# Patient Record
Sex: MALE | Race: BLACK OR AFRICAN AMERICAN | Employment: UNEMPLOYED | URBAN - NONMETROPOLITAN AREA
[De-identification: names, ages, dates, MRNs, and addresses within clinical notes are randomized per-mention and may not be internally consistent; named-entity substitution may affect disease eponyms.]

---

## 2017-06-10 ENCOUNTER — HOSPITAL ENCOUNTER (OUTPATIENT)
Facility: MEDICAL CENTER | Age: 32
End: 2017-06-10
Attending: PHYSICIAN ASSISTANT
Payer: MEDICAID

## 2017-06-10 ENCOUNTER — OFFICE VISIT (OUTPATIENT)
Dept: URGENT CARE | Facility: PHYSICIAN GROUP | Age: 32
End: 2017-06-10
Payer: MEDICAID

## 2017-06-10 VITALS
HEART RATE: 110 BPM | SYSTOLIC BLOOD PRESSURE: 124 MMHG | DIASTOLIC BLOOD PRESSURE: 84 MMHG | OXYGEN SATURATION: 96 % | TEMPERATURE: 98.6 F | RESPIRATION RATE: 16 BRPM

## 2017-06-10 DIAGNOSIS — M94.0 COSTOCHONDRITIS: ICD-10-CM

## 2017-06-10 DIAGNOSIS — R31.9 HEMATURIA: ICD-10-CM

## 2017-06-10 DIAGNOSIS — R39.198 DIFFICULTY URINATING: ICD-10-CM

## 2017-06-10 LAB
APPEARANCE UR: NORMAL
BILIRUB UR STRIP-MCNC: NORMAL MG/DL
COLOR UR AUTO: YELLOW
GLUCOSE UR STRIP.AUTO-MCNC: NORMAL MG/DL
KETONES UR STRIP.AUTO-MCNC: NORMAL MG/DL
LEUKOCYTE ESTERASE UR QL STRIP.AUTO: NORMAL
NITRITE UR QL STRIP.AUTO: NORMAL
PH UR STRIP.AUTO: 6 [PH] (ref 5–8)
PROT UR QL STRIP: NORMAL MG/DL
RBC UR QL AUTO: NORMAL
SP GR UR STRIP.AUTO: 1.01
UROBILINOGEN UR STRIP-MCNC: NORMAL MG/DL

## 2017-06-10 PROCEDURE — 81002 URINALYSIS NONAUTO W/O SCOPE: CPT | Performed by: PHYSICIAN ASSISTANT

## 2017-06-10 PROCEDURE — 99214 OFFICE O/P EST MOD 30 MIN: CPT | Mod: 25 | Performed by: PHYSICIAN ASSISTANT

## 2017-06-10 PROCEDURE — 87086 URINE CULTURE/COLONY COUNT: CPT

## 2017-06-10 RX ORDER — IBUPROFEN 800 MG/1
800 TABLET ORAL EVERY 8 HOURS PRN
Qty: 30 TAB | Refills: 0 | Status: SHIPPED | OUTPATIENT
Start: 2017-06-10 | End: 2020-08-05

## 2017-06-10 ASSESSMENT — PAIN SCALES - GENERAL: PAINLEVEL: 4=SLIGHT-MODERATE PAIN

## 2017-06-10 NOTE — MR AVS SNAPSHOT
Mars Weir JrMargarito   6/10/2017 9:05 AM   Office Visit   MRN: [id number]    Department:  [facility]   Dept Phone:  [phone]    Description:  Male : 1985   Provider:  Sharri Koenig PA-C           Reason for Visit     Abdominal Pain           Allergies as of 6/10/2017     No Known Allergies      You were diagnosed with     Costochondritis   [282041]       Difficulty urinating   [[id number]]         Vital Signs     Blood Pressure Pulse Temperature Respirations Oxygen Saturation       124/84 mmHg 110 37 °C (98.6 °F) 16 96%     Smoking Status                   Never Smoker            Basic Information     Date Of Birth Sex Race Ethnicity Preferred Language    1985 Male Unable to Obtain Unknown English      Health Maintenance        Date Due Completion Dates    IMM DTaP/Tdap/Td Vaccine (1 - Tdap) 2006 ---            Results     POCT Urinalysis      Component Value Standard Range & Units    POC Color yellow Negative    POC Appearance dark Negative    POC Leukocyte Esterase neg Negative    POC Nitrites neg Negative    POC Urobiligen neg Negative (0.2) mg/dL    POC Protein neg Negative mg/dL    POC Urine PH 6 5.0 - 8.0    POC Blood trace Negative    POC Specific Gravity 1.015 <1.005 - >1.030    POC Ketones neg Negative mg/dL    POC Biliruben neg Negative mg/dL    POC Glucose neg Negative mg/dL                        Current Immunizations     No immunizations on file.      Below and/or attached are the medications your provider expects you to take. Review all of your home medications and newly ordered medications with your provider and/or pharmacist. Follow medication instructions as directed by your provider and/or pharmacist. Please keep your medication list with you and share with your provider. Update the information when medications are discontinued, doses are changed, or new medications (including over-the-counter products) are added; and carry medication information at all times in  the event of emergency situations     Allergies:  No Known Allergies          Medications  Valid as of: Eli 10, 2017 - 10:01 AM    Generic Name Brand Name Tablet Size Instructions for use    Fluconazole (Tab) DIFLUCAN 150 MG Take 1 Tab by mouth every day.        Ibuprofen (Tab) MOTRIN 800 MG Take 1 Tab by mouth every 8 hours as needed for Mild Pain or Moderate Pain.        Indomethacin (Cap) INDOCIN 50 MG Take 1 Cap by mouth 3 times a day as needed.        Nystatin (Cream) MYCOSTATIN 508412 UNIT/GM Apply to affected area 2-3 times a day for 14 days.        .                 Medicines prescribed today were sent to:     [facility] [address] [place] [address], [place] - [address] [facility]    [address] [facility] [place] [address]    Phone: [phone] Fax: [phone]    Open 24 Hours?: No      Medication refill instructions:       If your prescription bottle indicates you have medication refills left, it is not necessary to call your provider’s office. Please contact your pharmacy and they will refill your medication.    If your prescription bottle indicates you do not have any refills left, you may request refills at any time through one of the following ways: The online [employer] system (except Urgent Care), by calling your provider’s office, or by asking your pharmacy to contact your provider’s office with a refill request. Medication refills are processed only during regular business hours and may not be available until the next business day. Your provider may request additional information or to have a follow-up visit with you prior to refilling your medication.   *Please Note: Medication refills are assigned a new Rx number when refilled electronically. Your pharmacy may indicate that no refills were authorized even though a new prescription for the same medication is available at the pharmacy. Please request the medicine by name with the pharmacy before contacting your provider for a refill.           [employer]  Status: Patient Declined

## 2017-06-10 NOTE — PROGRESS NOTES
Chief Complaint   Patient presents with   • Abdominal Pain       HISTORY OF PRESENT ILLNESS: Patient is a 31 y.o. male who presents today for evaluation of left side pain and difficulty urinating. Patient states he has been holding his urine because it makes his left side pain worse when he urinates. He denies hematuria and increased urinary frequency and urgency. Patient states that he stood up from the couch yesterday around 2300 hrs. when he felt a pop in his upper left side and immediately felt warmth. He now has persistent pain in that area that is worse with movement and any pressure on the side. He has not taken any over-the-counter medication.    There are no active problems to display for this patient.      Allergies:Review of patient's allergies indicates no known allergies.    Current Outpatient Prescriptions Ordered in [facility]   Medication Sig Dispense Refill   • ibuprofen (MOTRIN) 800 MG Tab Take 1 Tab by mouth every 8 hours as needed for Mild Pain or Moderate Pain. 30 Tab 0   • indomethacin (INDOCIN) 50 MG Cap Take 1 Cap by mouth 3 times a day as needed. 30 Cap 1   • fluconazole (DIFLUCAN) 150 MG tablet Take 1 Tab by mouth every day. 1 Tab 0   • nystatin (MYCOSTATIN) 362900 UNIT/GM Cream topical cream Apply to affected area 2-3 times a day for 14 days. 1 Tube 0     No current Epic-ordered facility-administered medications on file.       History reviewed. No pertinent past medical history.    Social History   Substance Use Topics   • Smoking status: Never Smoker    • Smokeless tobacco: None   • Alcohol Use: No       No family status information on file.   History reviewed. No pertinent family history.    ROS:   Review of Systems   Constitutional: Negative for fever, chills, weight loss and malaise/fatigue.   HENT: Negative for ear pain, nosebleeds, congestion, sore throat and neck pain.    Eyes: Negative for blurred vision.   Respiratory: Negative for cough, sputum production, shortness of breath and  wheezing.    Cardiovascular: Negative for chest pain, palpitations, orthopnea and leg swelling.   Gastrointestinal: Negative for heartburn, nausea, vomiting and abdominal pain.   Genitourinary: Negative for dysuria, urgency and frequency.       Exam:  Blood pressure 124/84, pulse 110, temperature 37 °C (98.6 °F), resp. rate 16, SpO2 96 %.  General: Normal appearing. No distress.  HEENT: Head is grossly normal.  Pulmonary: Clear to ausculation and percussion.  Normal effort. No rales, ronchi, or wheezing.   Cardiovascular: Regular rate and rhythm without murmur.   Chest wall: Chest wall is nontender to palpation. The anterior, inferior most aspect of the ribs on the left side are tender with pressure. Exam is somewhat limited due to body habitus. No skin changes are noted over the area of pain.  Neurologic: Grossly nonfocal.  Skin: No obvious lesions.  Psych: Normal mood. Alert and oriented x3. Judgment and insight is normal.    UA: Trace blood, otherwise negative    Assessment/Plan:  Take all medication as directed. Follow-up for worsening or persistent symptoms. Will contact patient with culture results. Follow-up with primary care provider for further evaluation of trace blood in his urine.   1. Costochondritis  ibuprofen (MOTRIN) 800 MG Tab   2. Difficulty urinating  POCT Urinalysis   3. Hematuria  URINE CULTURE(NEW)

## 2017-06-12 ENCOUNTER — TELEPHONE (OUTPATIENT)
Dept: URGENT CARE | Facility: PHYSICIAN GROUP | Age: 32
End: 2017-06-12

## 2017-06-12 LAB
BACTERIA UR CULT: NORMAL
SIGNIFICANT IND 70042: NORMAL
SOURCE SOURCE: NORMAL

## 2017-06-12 NOTE — Clinical Note
June 14, 2017        Mars Weir Jr.  [address] [place] [address]        Dear Mars,    Your urine culture was negative.  If you have any questions or concerns please call.  Also, please call us to update your phone number.  The number we have on file is not in service.  Thank you.        Sincerely,        Linda Dai, Medical Assistant  Signed Electronically

## 2017-08-21 ENCOUNTER — OFFICE VISIT (OUTPATIENT)
Dept: URGENT CARE | Facility: PHYSICIAN GROUP | Age: 32
End: 2017-08-21
Payer: MEDICAID

## 2017-08-21 VITALS
SYSTOLIC BLOOD PRESSURE: 142 MMHG | BODY MASS INDEX: 41.75 KG/M2 | OXYGEN SATURATION: 96 % | WEIGHT: 315 LBS | HEART RATE: 90 BPM | HEIGHT: 73 IN | RESPIRATION RATE: 20 BRPM | DIASTOLIC BLOOD PRESSURE: 92 MMHG | TEMPERATURE: 97.9 F

## 2017-08-21 DIAGNOSIS — K08.89 PAIN, DENTAL: ICD-10-CM

## 2017-08-21 DIAGNOSIS — K04.7 DENTAL INFECTION: ICD-10-CM

## 2017-08-21 PROCEDURE — 99214 OFFICE O/P EST MOD 30 MIN: CPT | Performed by: PHYSICIAN ASSISTANT

## 2017-08-21 RX ORDER — AMOXICILLIN AND CLAVULANATE POTASSIUM 875; 125 MG/1; MG/1
1 TABLET, FILM COATED ORAL 2 TIMES DAILY
Qty: 20 TAB | Refills: 0 | Status: SHIPPED | OUTPATIENT
Start: 2017-08-21 | End: 2017-08-31

## 2017-08-21 RX ORDER — IBUPROFEN 800 MG/1
800 TABLET ORAL EVERY 8 HOURS PRN
Qty: 30 TAB | Refills: 0 | Status: SHIPPED | OUTPATIENT
Start: 2017-08-21 | End: 2020-08-05

## 2017-08-21 NOTE — MR AVS SNAPSHOT
"        Mars Weir Jr.   2017 5:10 PM   Office Visit   MRN: [id number]    Department:  [facility]   Dept Phone:  [phone]    Description:  Male : 1985   Provider:  Sid Lennon PA-C           Reason for Visit     Dental Pain Upper Left Side, red, swollen, painful X 1 week      Allergies as of 2017     No Known Allergies      You were diagnosed with     Dental infection   [[id number]]       Pain, dental   [[id number]]         Vital Signs     Blood Pressure Pulse Temperature Respirations Height Weight    142/92 mmHg 90 36.6 °C (97.9 °F) 20 1.854 m (6' 1\") 197.768 kg (436 lb)    Body Mass Index Oxygen Saturation Smoking Status             57.54 kg/m2 96% Never Smoker          Basic Information     Date Of Birth Sex Race Ethnicity Preferred Language    1985 Male Unable to Obtain Unknown English      Health Maintenance        Date Due Completion Dates    IMM DTaP/Tdap/Td Vaccine (1 - Tdap) 2006 ---    IMM INFLUENZA (1) 2017 ---            Current Immunizations     No immunizations on file.      Below and/or attached are the medications your provider expects you to take. Review all of your home medications and newly ordered medications with your provider and/or pharmacist. Follow medication instructions as directed by your provider and/or pharmacist. Please keep your medication list with you and share with your provider. Update the information when medications are discontinued, doses are changed, or new medications (including over-the-counter products) are added; and carry medication information at all times in the event of emergency situations     Allergies:  No Known Allergies          Medications  Valid as of: 2017 -  5:22 PM    Generic Name Brand Name Tablet Size Instructions for use    Amoxicillin-Pot Clavulanate (Tab) AUGMENTIN 875-125 MG Take 1 Tab by mouth 2 times a day for 10 days.        Fluconazole (Tab) DIFLUCAN 150 MG Take 1 Tab by mouth every day.       "    Ibuprofen (Tab) MOTRIN 800 MG Take 1 Tab by mouth every 8 hours as needed for Mild Pain or Moderate Pain.        Ibuprofen (Tab) MOTRIN 800 MG Take 1 Tab by mouth every 8 hours as needed.        Indomethacin (Cap) INDOCIN 50 MG Take 1 Cap by mouth 3 times a day as needed.        Nystatin (Cream) MYCOSTATIN 665786 UNIT/GM Apply to affected area 2-3 times a day for 14 days.        .                 Medicines prescribed today were sent to:     [facility] [address] [place] [address] - [address] [facility]    [address] [facility] [place] [address]    Phone: [phone] Fax: [phone]    Open 24 Hours?: No      Medication refill instructions:       If your prescription bottle indicates you have medication refills left, it is not necessary to call your provider’s office. Please contact your pharmacy and they will refill your medication.    If your prescription bottle indicates you do not have any refills left, you may request refills at any time through one of the following ways: The online [email or web address] system (except Urgent Care), by calling your provider’s office, or by asking your pharmacy to contact your provider’s office with a refill request. Medication refills are processed only during regular business hours and may not be available until the next business day. Your provider may request additional information or to have a follow-up visit with you prior to refilling your medication.   *Please Note: Medication refills are assigned a new Rx number when refilled electronically. Your pharmacy may indicate that no refills were authorized even though a new prescription for the same medication is available at the pharmacy. Please request the medicine by name with the pharmacy before contacting your provider for a refill.           [employer] Status: Patient Declined

## 2017-08-22 NOTE — PROGRESS NOTES
Chief Complaint   Patient presents with   • Dental Pain     Upper Left Side, red, swollen, painful X 1 week       HISTORY OF PRESENT ILLNESS: Patient is a 32 y.o. male who presents today because he has dental pain. He has an appointment with dentist in one week, but has had left upper dental pain, swelling. He has been using some intermittent, ibuprofen, which has been helping, but not very much.    There are no active problems to display for this patient.      Allergies:Review of patient's allergies indicates no known allergies.    Current Outpatient Prescriptions Ordered in [facility]   Medication Sig Dispense Refill   • ibuprofen (MOTRIN) 800 MG Tab Take 1 Tab by mouth every 8 hours as needed. 30 Tab 0   • amoxicillin-clavulanate (AUGMENTIN) 875-125 MG Tab Take 1 Tab by mouth 2 times a day for 10 days. 20 Tab 0   • ibuprofen (MOTRIN) 800 MG Tab Take 1 Tab by mouth every 8 hours as needed for Mild Pain or Moderate Pain. 30 Tab 0   • indomethacin (INDOCIN) 50 MG Cap Take 1 Cap by mouth 3 times a day as needed. 30 Cap 1   • fluconazole (DIFLUCAN) 150 MG tablet Take 1 Tab by mouth every day. 1 Tab 0   • nystatin (MYCOSTATIN) 856699 UNIT/GM Cream topical cream Apply to affected area 2-3 times a day for 14 days. 1 Tube 0     No current Epic-ordered facility-administered medications on file.       No past medical history on file.    Social History   Substance Use Topics   • Smoking status: Never Smoker    • Smokeless tobacco: Never Used   • Alcohol Use: No       No family status information on file.   No family history on file.    ROS:  Review of Systems   Constitutional: Negative for fever, chills, weight loss and malaise/fatigue.   HENT: Negative for ear pain, nosebleeds, congestion, sore throat and neck pain.    Eyes: Negative for blurred vision.   Respiratory: Negative for cough, sputum production, shortness of breath and wheezing.    Cardiovascular: Negative for chest pain, palpitations, orthopnea and leg swelling.  "    Exam:  Blood pressure 142/92, pulse 90, temperature 36.6 °C (97.9 °F), resp. rate 20, height 1.854 m (6' 1\"), weight 197.768 kg (436 lb), SpO2 96 %.  General:  Well nourished, well developed male in NAD  Head:Normocephalic, atraumatic  Eyes: PERRLA, EOM within normal limits, no conjunctival injection, no scleral icterus, visual fields and acuity grossly intact.  Mouth: Teeth have Multiple areas of decay in varying stages. There is swelling and erythema about the left upper teeth, #12, 13 and 14, no fluctuance No pharyngeal erythema exudates or tonsillar enlargement.  Extremities: no clubbing, cyanosis, or edema.    Please note that this dictation was created using voice recognition software. I have made every reasonable attempt to correct obvious errors, but I expect that there are errors of grammar and possibly content that I did not discover before finalizing the note.    Assessment/Plan:  1. Dental infection  amoxicillin-clavulanate (AUGMENTIN) 875-125 MG Tab   2. Pain, dental  ibuprofen (MOTRIN) 800 MG Tab    follow-up with dentist as scheduled    Followup with primary care in the next 7-10 days if not significantly improving, return to the urgent care or go to the emergency room sooner for any worsening of symptoms.         "

## 2019-02-27 ENCOUNTER — APPOINTMENT (OUTPATIENT)
Dept: RADIOLOGY | Facility: IMAGING CENTER | Age: 34
End: 2019-02-27
Attending: PHYSICIAN ASSISTANT
Payer: MEDICAID

## 2019-02-27 ENCOUNTER — OFFICE VISIT (OUTPATIENT)
Dept: URGENT CARE | Facility: PHYSICIAN GROUP | Age: 34
End: 2019-02-27
Payer: MEDICAID

## 2019-02-27 VITALS
HEART RATE: 79 BPM | DIASTOLIC BLOOD PRESSURE: 96 MMHG | SYSTOLIC BLOOD PRESSURE: 130 MMHG | RESPIRATION RATE: 20 BRPM | HEIGHT: 73 IN | WEIGHT: 315 LBS | OXYGEN SATURATION: 96 % | TEMPERATURE: 98.4 F | BODY MASS INDEX: 41.75 KG/M2

## 2019-02-27 DIAGNOSIS — R07.81 RIB PAIN ON RIGHT SIDE: ICD-10-CM

## 2019-02-27 PROCEDURE — 99214 OFFICE O/P EST MOD 30 MIN: CPT | Performed by: PHYSICIAN ASSISTANT

## 2019-02-27 PROCEDURE — 71046 X-RAY EXAM CHEST 2 VIEWS: CPT | Performed by: FAMILY MEDICINE

## 2019-02-27 RX ORDER — PREDNISONE 10 MG/1
TABLET ORAL
Qty: 1 QUANTITY SUFFICIENT | Refills: 0 | Status: SHIPPED | OUTPATIENT
Start: 2019-02-27 | End: 2020-08-05

## 2019-02-27 RX ORDER — BENZONATATE 200 MG/1
200 CAPSULE ORAL 3 TIMES DAILY PRN
Qty: 30 CAP | Refills: 0 | Status: SHIPPED | OUTPATIENT
Start: 2019-02-27 | End: 2020-08-05

## 2019-02-27 NOTE — PROGRESS NOTES
Chief Complaint   Patient presents with   • Rib Pain     R x 2 months       HISTORY OF PRESENT ILLNESS: Patient is a 33 y.o. male who presents today for the following:    Patient comes in for evaluation of right-sided rib pain for the past 2 months.  He states he has had a cough for about the same amount of time, complaining of worsening pain with coughing.  He has pain with any direct pressure in the area, moving his bowels, and other sporadic movements.  He admittedly self medicates with marijuana and states he has not taken any ibuprofen or acetaminophen because it upsets his stomach.  He is trying to quit smoking cigarettes but has not done so yet.  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    There are no active problems to display for this patient.      Allergies:Patient has no known allergies.    Current Outpatient Prescriptions Ordered in [facility]   Medication Sig Dispense Refill   • benzonatate (TESSALON) 200 MG capsule Take 1 Cap by mouth 3 times a day as needed for Cough. 30 Cap 0   • predniSONE (DELTASONE) 10 MG Tab 50 mg x 1 day; 40 mg x 1 day; 30 mg x 1 day; 20 mg x 1 day; 10 mg x 1 day 1 Quantity Sufficient 0   • ibuprofen (MOTRIN) 800 MG Tab Take 1 Tab by mouth every 8 hours as needed. (Patient not taking: Reported on 2/27/2019) 30 Tab 0   • ibuprofen (MOTRIN) 800 MG Tab Take 1 Tab by mouth every 8 hours as needed for Mild Pain or Moderate Pain. (Patient not taking: Reported on 2/27/2019) 30 Tab 0   • indomethacin (INDOCIN) 50 MG Cap Take 1 Cap by mouth 3 times a day as needed. (Patient not taking: Reported on 2/27/2019) 30 Cap 1   • fluconazole (DIFLUCAN) 150 MG tablet Take 1 Tab by mouth every day. (Patient not taking: Reported on 2/27/2019) 1 Tab 0   • nystatin (MYCOSTATIN) 510479 UNIT/GM Cream topical cream Apply to affected area 2-3 times a day for 14 days. (Patient not taking: Reported on 2/27/2019) 1 Tube 0     No current Epic-ordered facility-administered medications on file.        No past medical  "history on file.    Social History   Substance Use Topics   • Smoking status: Never Smoker   • Smokeless tobacco: Never Used   • Alcohol use No       No family status information on file.   No family history on file.    Review of Systems:    Constitutional ROS: No unexpected change in weight, No weakness, No fatigue  Eye ROS: No recent significant change in vision, No eye pain, redness, discharge  Ear ROS: No drainage, No tinnitus or vertigo, No recent change in hearing  Mouth/Throat ROS: No teeth or gum problems, No bleeding gums, No tongue complaints  Neck ROS: No swollen glands, No significant pain in neck  Pulmonary ROS: No chronic cough, sputum, or hemoptysis, No dyspnea on exertion, No wheezing  Cardiovascular ROS: No diaphoresis, No edema, No palpitations  Gastrointestinal ROS: No change in bowel habits, No significant change in appetite, No nausea, vomiting, diarrhea, or constipation  Musculoskeletal/Extremities ROS: No peripheral edema, No pain, redness or swelling on the joints  Hematologic/Lymphatic ROS: No chills, No night sweats, No weight loss  Skin/Integumentary ROS: No edema, No evidence of rash, No itching      Exam:  Blood pressure 130/96, pulse 79, temperature 36.9 °C (98.4 °F), temperature source Temporal, resp. rate 20, height 1.854 m (6' 1\"), weight (!) 181.4 kg (400 lb), SpO2 96 %.  General: Well developed, well nourished. No distress.  Pulmonary: Unlabored respiratory effort. Lungs clear to auscultation, no wheezes, no rhonchi.  Cardiovascular: Regular rate and rhythm without murmur.  Mild tenderness noted on the ribs on the right side under the breast and into the mid axillary region.  No soft tissue swelling, erythema, rashes, or other skin changes noted in the area of pain.  Neurologic: Grossly nonfocal. No facial asymmetry noted.  Skin: Warm, dry, good turgor. No rashes in visible areas.   Psych: Normal mood. Alert and oriented x3. Judgment and insight is normal.    Chest x-ray, per " radiology:  Impression       No active disease.     Assessment/Plan:  Discussed with patient that this may be costochondritis or an irritated nerve.  Will start steroids to help decrease any inflammation and help suppress his cough to reduce further aggravation.  Emphasized importance of following up with primary care for further evaluation and management of the cough.  Follow up for worsening or persistent symptoms.  1. Rib pain on right side  DX-CHEST-2 VIEWS    benzonatate (TESSALON) 200 MG capsule    predniSONE (DELTASONE) 10 MG Tab

## 2019-03-02 ENCOUNTER — TELEPHONE (OUTPATIENT)
Dept: URGENT CARE | Facility: PHYSICIAN GROUP | Age: 34
End: 2019-03-02

## 2019-03-04 ENCOUNTER — TELEPHONE (OUTPATIENT)
Dept: URGENT CARE | Facility: PHYSICIAN GROUP | Age: 34
End: 2019-03-04

## 2020-08-05 ENCOUNTER — OFFICE VISIT (OUTPATIENT)
Dept: URGENT CARE | Facility: PHYSICIAN GROUP | Age: 35
End: 2020-08-05
Payer: MEDICAID

## 2020-08-05 VITALS
HEIGHT: 74 IN | WEIGHT: 315 LBS | TEMPERATURE: 98 F | BODY MASS INDEX: 40.43 KG/M2 | SYSTOLIC BLOOD PRESSURE: 142 MMHG | RESPIRATION RATE: 16 BRPM | OXYGEN SATURATION: 97 % | DIASTOLIC BLOOD PRESSURE: 80 MMHG | HEART RATE: 86 BPM

## 2020-08-05 DIAGNOSIS — S20.212A CONTUSION OF RIB ON LEFT SIDE, INITIAL ENCOUNTER: ICD-10-CM

## 2020-08-05 PROCEDURE — 99214 OFFICE O/P EST MOD 30 MIN: CPT | Performed by: PHYSICIAN ASSISTANT

## 2020-08-05 RX ORDER — IBUPROFEN 800 MG/1
800 TABLET ORAL EVERY 8 HOURS PRN
Qty: 30 TAB | Refills: 0 | Status: SHIPPED | OUTPATIENT
Start: 2020-08-05 | End: 2023-03-23

## 2020-08-05 NOTE — PROGRESS NOTES
"Chief Complaint   Patient presents with   • Rib Injury     Rib injury, Pt states he jumped into a pool, injuring his ribs on the LT side.        HISTORY OF PRESENT ILLNESS: Patient is a 34 y.o. male who presents today for the following:    Rib pain left side since yesterday  Jumped in a pool and hit the bottom  Not worse with breathing, twisting trunk, lifting  Worse with direct pressure/sleeping on that side  OTC meds tried: none  Denies SOB    There are no active problems to display for this patient.      Allergies:Patient has no known allergies.    Current Outpatient Medications Ordered in Epic   Medication Sig Dispense Refill   • ibuprofen (MOTRIN) 800 MG Tab Take 1 Tab by mouth every 8 hours as needed for Mild Pain. 30 Tab 0     No current Epic-ordered facility-administered medications on file.        History reviewed. No pertinent past medical history.    Social History     Tobacco Use   • Smoking status: Never Smoker   • Smokeless tobacco: Never Used   Substance Use Topics   • Alcohol use: No   • Drug use: Yes     Types: Marijuana       No family status information on file.   History reviewed. No pertinent family history.    Review of Systems:   Constitutional ROS: No unexpected change in weight, No weakness, No fatigue  Pulmonary ROS: No chronic cough, sputum, or hemoptysis, No dyspnea on exertion, No wheezing  Cardiovascular ROS: No diaphoresis, No edema, No palpitations  Musculoskeletal/Extremities ROS: left rib pain  Hematologic/Lymphatic ROS: No chills, No night sweats, No weight loss  Skin/Integumentary ROS: No edema, No evidence of rash, No itching      Exam:  /80   Pulse 86   Temp 36.7 °C (98 °F) (Temporal)   Resp 16   Ht 1.88 m (6' 2\")   Wt (!) 148.3 kg (327 lb)   SpO2 97%   General: Well developed, well nourished. No distress.    HENT: Head is grossly normal.  Pulmonary: Unlabored respiratory effort.   Neurologic: Grossly nonfocal. No facial asymmetry noted.  Musculoskeletal: Mild TTP " anterior/inferior left ribs without ecchymosis, STS, paradoxical movement. No ecchymosis noted anywhere on the trunk.  Skin: Warm, dry, good turgor. No rashes in visible areas.   Psych: Normal mood. Alert and oriented to person, place and time.    Assessment/Plan:  Low suspicion for rib fracture.  Discussed appropriate over-the-counter symptomatic medication, and when to return to clinic. Follow up for worsening or persistent symptoms.  1. Contusion of rib on left side, initial encounter  ibuprofen (MOTRIN) 800 MG Tab

## 2022-04-05 ENCOUNTER — OFFICE VISIT (OUTPATIENT)
Dept: URGENT CARE | Facility: PHYSICIAN GROUP | Age: 37
End: 2022-04-05
Payer: MEDICAID

## 2022-04-05 VITALS
TEMPERATURE: 96.6 F | OXYGEN SATURATION: 98 % | HEIGHT: 73 IN | BODY MASS INDEX: 33.13 KG/M2 | DIASTOLIC BLOOD PRESSURE: 96 MMHG | HEART RATE: 71 BPM | SYSTOLIC BLOOD PRESSURE: 152 MMHG | WEIGHT: 250 LBS | RESPIRATION RATE: 16 BRPM

## 2022-04-05 DIAGNOSIS — K04.7 DENTAL INFECTION: ICD-10-CM

## 2022-04-05 PROCEDURE — 99213 OFFICE O/P EST LOW 20 MIN: CPT | Performed by: NURSE PRACTITIONER

## 2022-04-05 RX ORDER — CLINDAMYCIN HYDROCHLORIDE 300 MG/1
300 CAPSULE ORAL 4 TIMES DAILY
Qty: 28 CAPSULE | Refills: 0 | Status: SHIPPED | OUTPATIENT
Start: 2022-04-05 | End: 2022-04-12

## 2022-04-05 ASSESSMENT — ENCOUNTER SYMPTOMS
CHILLS: 0
FEVER: 0
NAUSEA: 0
VOMITING: 0

## 2022-04-05 NOTE — PROGRESS NOTES
"Subjective:     Mars Weir Jr. is a 36 y.o. male who presents for Dental Pain (Abcess)      HPI  Pt presents for evaluation of a new problem.  Mars is a pleasant 36-year-old male who presents to urgent care today with multiple dental infections that have been ongoing for the past 10 days.  His symptoms have worsened in the past 2 days.  He notes that he has no current pain as he has been taking his son's pain medication.  He is unsure of what this medication is.  This is providing good relief of his symptoms.  He is also swishing and spitting with medicated mouthwash.  He denies any fevers, chills, nausea or vomiting.  He did have purulent drainage coming out of his broken tooth on the left upper side last night.    Review of Systems   Constitutional: Negative for chills and fever.   Gastrointestinal: Negative for nausea and vomiting.       PMH: History reviewed. No pertinent past medical history.  ALLERGIES: No Known Allergies  SURGHX: History reviewed. No pertinent surgical history.  SOCHX:   Social History     Socioeconomic History   • Marital status:    Tobacco Use   • Smoking status: Current Every Day Smoker     Types: Cigarettes   • Smokeless tobacco: Never Used   Vaping Use   • Vaping Use: Never used   Substance and Sexual Activity   • Alcohol use: No   • Drug use: Yes     Types: Marijuana   • Sexual activity: Yes     Partners: Female     FH: History reviewed. No pertinent family history.      Objective:   /96   Pulse 71   Temp 35.9 °C (96.6 °F) (Temporal)   Resp 16   Ht 1.854 m (6' 1\")   Wt 113 kg (250 lb)   SpO2 98%   BMI 32.98 kg/m²     Physical Exam  Vitals and nursing note reviewed.   Constitutional:       General: He is not in acute distress.     Appearance: Normal appearance. He is not ill-appearing.   HENT:      Head: Normocephalic and atraumatic.      Right Ear: External ear normal.      Left Ear: External ear normal.      Nose: No congestion or rhinorrhea.      " Mouth/Throat:      Mouth: Mucous membranes are moist.      Dentition: Abnormal dentition. Dental tenderness, gingival swelling and dental caries present. No dental abscesses.        Comments: Multiple broken teeth throughout mouth.  Gingiva right upper anterior teeth swollen and erythemic.  Negative for pain with palpation.  No purulent drainage noted.  Eyes:      Extraocular Movements: Extraocular movements intact.      Pupils: Pupils are equal, round, and reactive to light.   Cardiovascular:      Rate and Rhythm: Normal rate and regular rhythm.      Pulses: Normal pulses.      Heart sounds: Normal heart sounds.   Pulmonary:      Effort: Pulmonary effort is normal.      Breath sounds: Normal breath sounds.   Abdominal:      General: Abdomen is flat. Bowel sounds are normal.      Palpations: Abdomen is soft.      Tenderness: There is no abdominal tenderness. There is no right CVA tenderness or left CVA tenderness.   Musculoskeletal:         General: Normal range of motion.      Cervical back: Normal range of motion and neck supple.   Skin:     General: Skin is warm and dry.      Capillary Refill: Capillary refill takes less than 2 seconds.   Neurological:      General: No focal deficit present.      Mental Status: He is alert and oriented to person, place, and time. Mental status is at baseline.   Psychiatric:         Mood and Affect: Mood normal.         Behavior: Behavior normal.         Thought Content: Thought content normal.         Judgment: Judgment normal.         Assessment/Plan:   Assessment      1. Dental infection  clindamycin (CLEOCIN) 300 MG Cap   Patient to start clindamycin 4 times daily for treatment of dental infection.  He does have follow-up dentist appointment next week.  Follow-up for worsening or persistent symptoms including fever, chills, nausea, vomiting or change in mental status.

## 2023-01-01 NOTE — TELEPHONE ENCOUNTER
Phone number is wrong.    Letter sent.  
Please let pt know her urine culture is negative. Follow up for persistent symptoms.    
Unknown

## 2023-03-23 ENCOUNTER — OFFICE VISIT (OUTPATIENT)
Dept: URGENT CARE | Facility: PHYSICIAN GROUP | Age: 38
End: 2023-03-23
Payer: MEDICAID

## 2023-03-23 VITALS
OXYGEN SATURATION: 98 % | WEIGHT: 250.6 LBS | TEMPERATURE: 97.2 F | SYSTOLIC BLOOD PRESSURE: 110 MMHG | RESPIRATION RATE: 18 BRPM | DIASTOLIC BLOOD PRESSURE: 62 MMHG | HEART RATE: 79 BPM | HEIGHT: 72 IN | BODY MASS INDEX: 33.94 KG/M2

## 2023-03-23 DIAGNOSIS — H10.31 ACUTE CONJUNCTIVITIS OF RIGHT EYE, UNSPECIFIED ACUTE CONJUNCTIVITIS TYPE: ICD-10-CM

## 2023-03-23 PROCEDURE — 99213 OFFICE O/P EST LOW 20 MIN: CPT | Performed by: STUDENT IN AN ORGANIZED HEALTH CARE EDUCATION/TRAINING PROGRAM

## 2023-03-23 RX ORDER — CIPROFLOXACIN HYDROCHLORIDE 3.5 MG/ML
1 SOLUTION/ DROPS TOPICAL 4 TIMES DAILY
Qty: 5 ML | Refills: 0 | Status: SHIPPED | OUTPATIENT
Start: 2023-03-23 | End: 2023-03-28

## 2023-03-23 RX ORDER — CETIRIZINE HYDROCHLORIDE 10 MG/1
10 TABLET ORAL DAILY
Qty: 14 TABLET | Refills: 0 | Status: SHIPPED | OUTPATIENT
Start: 2023-03-23 | End: 2023-04-06

## 2023-03-23 RX ORDER — OFLOXACIN 3 MG/ML
1 SOLUTION/ DROPS OPHTHALMIC 4 TIMES DAILY
Qty: 5 ML | Refills: 0 | Status: SHIPPED | OUTPATIENT
Start: 2023-03-23 | End: 2023-03-23

## 2023-03-23 ASSESSMENT — ENCOUNTER SYMPTOMS
CHILLS: 0
FOREIGN BODY SENSATION: 0
HEADACHES: 0
VOMITING: 0
CONSTIPATION: 0
PHOTOPHOBIA: 0
DIZZINESS: 0
FEVER: 0
NAUSEA: 0
PALPITATIONS: 0
ABDOMINAL PAIN: 0
SHORTNESS OF BREATH: 0
COUGH: 0
SORE THROAT: 0
EYE DISCHARGE: 1
DOUBLE VISION: 0
EYE PAIN: 0
EYE REDNESS: 1
WHEEZING: 0
BLURRED VISION: 0
EYE ITCHING: 1
DIARRHEA: 0

## 2023-03-23 ASSESSMENT — VISUAL ACUITY: OU: 1
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
## 2023-10-04 ENCOUNTER — OFFICE VISIT (OUTPATIENT)
Dept: URGENT CARE | Facility: PHYSICIAN GROUP | Age: 38
End: 2023-10-04
Payer: MEDICAID

## 2023-10-04 VITALS
BODY MASS INDEX: 31.49 KG/M2 | TEMPERATURE: 99.4 F | HEIGHT: 73 IN | WEIGHT: 237.6 LBS | RESPIRATION RATE: 16 BRPM | OXYGEN SATURATION: 98 % | DIASTOLIC BLOOD PRESSURE: 72 MMHG | HEART RATE: 79 BPM | SYSTOLIC BLOOD PRESSURE: 114 MMHG

## 2023-10-04 DIAGNOSIS — J02.0 STREP PHARYNGITIS: ICD-10-CM

## 2023-10-04 LAB — S PYO DNA SPEC NAA+PROBE: DETECTED

## 2023-10-04 PROCEDURE — 87651 STREP A DNA AMP PROBE: CPT | Performed by: REGISTERED NURSE

## 2023-10-04 PROCEDURE — 3074F SYST BP LT 130 MM HG: CPT | Performed by: REGISTERED NURSE

## 2023-10-04 PROCEDURE — 3078F DIAST BP <80 MM HG: CPT | Performed by: REGISTERED NURSE

## 2023-10-04 PROCEDURE — 99213 OFFICE O/P EST LOW 20 MIN: CPT | Performed by: REGISTERED NURSE

## 2023-10-04 RX ORDER — AMOXICILLIN 500 MG/1
500 CAPSULE ORAL 2 TIMES DAILY
Qty: 20 CAPSULE | Refills: 0 | Status: SHIPPED | OUTPATIENT
Start: 2023-10-04 | End: 2023-10-14

## 2023-10-04 ASSESSMENT — ENCOUNTER SYMPTOMS
HEADACHES: 0
SHORTNESS OF BREATH: 0
DIZZINESS: 0
CHILLS: 0
FEVER: 0
ABDOMINAL PAIN: 0

## 2023-10-04 NOTE — LETTER
October 4, 2023         Patient: Mars Weir .   YOB: 1985   Date of Visit: 10/4/2023           To Whom it May Concern:    Mars Weir was seen in my clinic on 10/4/2023. He may return to work on 10/05/2023.    If you have any questions or concerns, please don't hesitate to call.        Sincerely,           SHERI Flores.  Electronically Signed

## 2023-10-05 NOTE — PROGRESS NOTES
"Subjective:   Mars Weir Jr. is a 38 y.o. male who presents for Pharyngitis (Sore throat, fever, sweats. )    HPI  3 days of sore throat, feeling feverish with some chills.  Not taking any OTC medications.  Confirmed exposure to recent partner who was strep positive.  No history of hospitalizations from strep or its sequelae.  Tolerating p.o. Denies difficulty opening mouth, muffled voice, drooling, decreased ROM neck    [phone], okay to leave message. Send to [facility]    Review of Systems   Constitutional:  Negative for chills and fever.   Respiratory:  Negative for shortness of breath.    Cardiovascular:  Negative for chest pain.   Gastrointestinal:  Negative for abdominal pain.   Skin:  Negative for rash.   Neurological:  Negative for dizziness and headaches.     Medications, Allergies, and current problem list reviewed today in Epic.     Objective:     /72   Pulse 79   Temp 37.4 °C (99.4 °F) (Temporal)   Resp 16   Ht 1.854 m (6' 1\")   Wt 108 kg (237 lb 9.6 oz)   SpO2 98%     Physical Exam  Vitals and nursing note reviewed.   Constitutional:       General: He is not in acute distress.     Appearance: Normal appearance. He is well-developed. He is not ill-appearing, toxic-appearing or diaphoretic.   HENT:      Head: Normocephalic and atraumatic.      Right Ear: Tympanic membrane, ear canal and external ear normal.      Left Ear: Tympanic membrane, ear canal and external ear normal.      Nose: Nose normal. No congestion or rhinorrhea.      Mouth/Throat:      Mouth: Mucous membranes are moist.      Pharynx: Oropharynx is clear. Uvula midline. Posterior oropharyngeal erythema present. No oropharyngeal exudate.   Eyes:      General:         Right eye: No discharge.         Left eye: No discharge.      Conjunctiva/sclera: Conjunctivae normal.   Cardiovascular:      Rate and Rhythm: Normal rate and regular rhythm.      Pulses: Normal pulses.      Heart sounds: Normal heart sounds. No murmur " heard.  Pulmonary:      Effort: Pulmonary effort is normal. No respiratory distress.      Breath sounds: Normal breath sounds. No wheezing, rhonchi or rales.   Chest:      Chest wall: No tenderness.   Musculoskeletal:         General: No swelling or tenderness.      Cervical back: Normal range of motion and neck supple.      Right lower leg: No edema.      Left lower leg: No edema.   Lymphadenopathy:      Cervical: No cervical adenopathy.   Skin:     General: Skin is warm and dry.   Neurological:      General: No focal deficit present.      Mental Status: He is alert and oriented to person, place, and time. Mental status is at baseline.   Psychiatric:         Mood and Affect: Mood normal.         Behavior: Behavior normal.         Thought Content: Thought content normal.         Judgment: Judgment normal.       Assessment/Plan:     Diagnosis and associated orders:     1. Strep pharyngitis  POCT GROUP A STREP, PCR    amoxicillin (AMOXIL) 500 MG Cap           Comments/MDM:   Differential diagnosis discussed     Pleasant 38-year-old male presenting with sore throat fevers and chills.  Confirmed exposure to female with confirmed strep throat.  He has never been hospitalized for strep.  No pertinent medical history.  Tolerating p.o. and managing oral secretions.  Vital signs within normal limits.  On exam he has some posterior OP erythema, uvula midline no signs of airway compromise remainder of exam is unremarkable.  In office Cepheid strep is positive.  Will start on oral amoxicillin discussed throwing away toothbrush after 48 hours, contagious for 24 hours.  OTC analgesics and homeopathic therapies for comfort.  Push fluids.  Monitor symptoms.    Return to clinic or go to ED if symptoms worsen or persist. Indications for ED discussed at length. Patient/Parent/Guardian voices understanding. Follow-up with your primary care provider in 3-5 days. Red flag symptoms discussed. All side effects of medication discussed  including allergic response, GI upset, tendon injury, rash, sedation etc.    I personally reviewed prior external notes and test results pertinent to today's visit as well as additional imaging and testing completed in clinic today.     Please note that this dictation was created using voice recognition software. I have made every reasonable attempt to correct obvious errors, but I expect that there are errors of grammar and possibly content that I did not discover before finalizing the note.    This note was electronically signed by ONE Flores

## 2024-04-03 ENCOUNTER — OFFICE VISIT (OUTPATIENT)
Dept: URGENT CARE | Facility: PHYSICIAN GROUP | Age: 39
End: 2024-04-03
Payer: MEDICAID

## 2024-04-03 VITALS
HEART RATE: 96 BPM | BODY MASS INDEX: 32.07 KG/M2 | DIASTOLIC BLOOD PRESSURE: 70 MMHG | OXYGEN SATURATION: 97 % | SYSTOLIC BLOOD PRESSURE: 122 MMHG | RESPIRATION RATE: 14 BRPM | WEIGHT: 242 LBS | TEMPERATURE: 98 F | HEIGHT: 73 IN

## 2024-04-03 DIAGNOSIS — H10.33 ACUTE BACTERIAL CONJUNCTIVITIS OF BOTH EYES: ICD-10-CM

## 2024-04-03 PROCEDURE — 3074F SYST BP LT 130 MM HG: CPT | Performed by: NURSE PRACTITIONER

## 2024-04-03 PROCEDURE — 99213 OFFICE O/P EST LOW 20 MIN: CPT | Performed by: NURSE PRACTITIONER

## 2024-04-03 PROCEDURE — 3078F DIAST BP <80 MM HG: CPT | Performed by: NURSE PRACTITIONER

## 2024-04-03 RX ORDER — POLYMYXIN B SULFATE AND TRIMETHOPRIM 1; 10000 MG/ML; [USP'U]/ML
1 SOLUTION OPHTHALMIC EVERY 4 HOURS
Qty: 10 ML | Refills: 0 | Status: SHIPPED | OUTPATIENT
Start: 2024-04-03

## 2024-04-03 ASSESSMENT — ENCOUNTER SYMPTOMS
PHOTOPHOBIA: 0
BLURRED VISION: 1
EYE REDNESS: 1
DOUBLE VISION: 0
EYE PAIN: 1
EYE DISCHARGE: 1

## 2024-04-03 ASSESSMENT — VISUAL ACUITY: OU: 1

## 2024-04-03 NOTE — PROGRESS NOTES
"Subjective:     Mars Weir Jr. is a 38 y.o. male who presents for Eye Problem (Bilateral redness and burning x 3-4 days, pt states slight change in his vision  )      Eye Problem   Associated symptoms include blurred vision, an eye discharge and eye redness. Pertinent negatives include no double vision or photophobia.     Pt presents for evaluation of a new problem.  Mars is a pleasant 38-year-old male presents to urgent care today with complaints of bilateral eye redness, drainage, pain and itching x 4 days.  His symptoms did begin in his left eye and slowly have moved over to his right eye.  No changes with range of motion.  His kids at home are suffering from similar eye symptoms.  No recent congestion, fevers or chills.  He has not used any treatment for his symptoms.    Review of Systems   Eyes:  Positive for blurred vision, pain, discharge and redness. Negative for double vision and photophobia.       PMH: History reviewed. No pertinent past medical history.  ALLERGIES: No Known Allergies  SURGHX: History reviewed. No pertinent surgical history.  SOCHX:   Social History     Socioeconomic History    Marital status:    Tobacco Use    Smoking status: Every Day     Types: Cigarettes    Smokeless tobacco: Never   Vaping Use    Vaping Use: Never used   Substance and Sexual Activity    Alcohol use: No    Drug use: Yes     Types: Marijuana    Sexual activity: Yes     Partners: Female     FH: History reviewed. No pertinent family history.      Objective:   /70   Pulse 96   Temp 36.7 °C (98 °F) (Temporal)   Resp 14   Ht 1.854 m (6' 1\")   Wt 110 kg (242 lb)   SpO2 97%   BMI 31.93 kg/m²     Physical Exam  Vitals and nursing note reviewed.   Constitutional:       General: He is not in acute distress.     Appearance: Normal appearance. He is normal weight. He is not ill-appearing or toxic-appearing.   HENT:      Head: Normocephalic.      Right Ear: External ear normal.      Left Ear: External ear " normal.      Nose: Nose normal.      Mouth/Throat:      Mouth: Mucous membranes are moist.   Eyes:      General: Lids are normal. Vision grossly intact. Gaze aligned appropriately.         Right eye: No foreign body, discharge or hordeolum.         Left eye: No foreign body, discharge or hordeolum.      Extraocular Movements: Extraocular movements intact.      Conjunctiva/sclera:      Right eye: Right conjunctiva is injected. Chemosis and exudate present. No hemorrhage.     Left eye: Left conjunctiva is injected. Chemosis and exudate present. No hemorrhage.     Pupils: Pupils are equal, round, and reactive to light.   Cardiovascular:      Rate and Rhythm: Normal rate and regular rhythm.   Pulmonary:      Effort: Pulmonary effort is normal.      Breath sounds: Normal breath sounds.   Abdominal:      General: Abdomen is flat.   Musculoskeletal:         General: Normal range of motion.      Cervical back: Normal range of motion and neck supple. No rigidity.   Lymphadenopathy:      Cervical: No cervical adenopathy.   Skin:     General: Skin is warm and dry.   Neurological:      General: No focal deficit present.      Mental Status: He is alert and oriented to person, place, and time. Mental status is at baseline.   Psychiatric:         Mood and Affect: Mood normal.         Behavior: Behavior normal.         Judgment: Judgment normal.         Assessment/Plan:   Assessment      1. Acute bacterial conjunctivitis of both eyes  polymixin-trimethoprim (POLYTRIM) 89568-0.1 UNIT/ML-% Solution      Symptoms at this time are consistent with bacterial conjunctivitis.  Prescription Polytrim sent to pharmacy for treatment.  Importance of eye hygiene discussed with patient.  Cool compresses encouraged for relief of inflammation and itching.  Patient to follow-up for worsening or persistent symptoms.  He is in agreement with plan of care.

## 2025-04-02 ENCOUNTER — OFFICE VISIT (OUTPATIENT)
Dept: URGENT CARE | Facility: PHYSICIAN GROUP | Age: 40
End: 2025-04-02
Payer: COMMERCIAL

## 2025-04-02 VITALS
HEART RATE: 60 BPM | HEIGHT: 73 IN | OXYGEN SATURATION: 99 % | DIASTOLIC BLOOD PRESSURE: 68 MMHG | RESPIRATION RATE: 20 BRPM | BODY MASS INDEX: 35.63 KG/M2 | TEMPERATURE: 98 F | WEIGHT: 268.8 LBS | SYSTOLIC BLOOD PRESSURE: 118 MMHG

## 2025-04-02 DIAGNOSIS — K04.7 DENTAL INFECTION: ICD-10-CM

## 2025-04-02 PROCEDURE — 99213 OFFICE O/P EST LOW 20 MIN: CPT | Performed by: NURSE PRACTITIONER

## 2025-04-02 PROCEDURE — 3074F SYST BP LT 130 MM HG: CPT | Performed by: NURSE PRACTITIONER

## 2025-04-02 PROCEDURE — 3078F DIAST BP <80 MM HG: CPT | Performed by: NURSE PRACTITIONER

## 2025-04-02 RX ORDER — TRAMADOL HYDROCHLORIDE 50 MG/1
50 TABLET ORAL EVERY 6 HOURS PRN
Qty: 12 TABLET | Refills: 0 | Status: SHIPPED | OUTPATIENT
Start: 2025-04-02 | End: 2025-04-05

## 2025-04-02 NOTE — LETTER
[facility]  [facility] [address] [address]     April 2, 2025    Patient: Mars Weir Jr.   YOB: 1985   Date of Visit: 4/2/2025       To Whom It May Concern:    Mars Weir was seen and treated in our department on 4/2/2025.     Sincerely,     SHERI Lua.

## 2025-04-06 NOTE — PROGRESS NOTES
Chief Complaint   Patient presents with    Dental Pain     Broken tooth bottom left - pt has appt with dentist next week but the pain has been too much           History of Present Illness  The patient is a 39-year-old male who presents for evaluation of a broken tooth. He is accompanied by his wife.    He has been experiencing discomfort from a fractured tooth on the left lower jaw for approximately 4 days. The pain has escalated to the point where it induces severe headaches and vomiting. His wife reports that they have exhausted all over-the-counter remedies, including mouthwashes, Tylenol Extra Strength, and ibuprofen, without any significant relief. He has an upcoming appointment with a dentist next week. He finds some relief by keeping water in his mouth, but warm water exacerbates the pain. They have also attempted to alleviate the pain with salt water rinses and clove oil.    ALLERGIES  The patient has no known allergies.    MEDICATIONS  Current: Tylenol extra strength, ibuprofen         ROS:    No severe shortness of breath   No Cardiac like chest pain, as discussed   As otherwise stated in HPI    Medical/SX/ Social History:  Reviewed per chart    Pertinent Medications:    Current Outpatient Medications on File Prior to Visit   Medication Sig Dispense Refill    polymixin-trimethoprim (POLYTRIM) 20583-2.1 UNIT/ML-% Solution Administer 1 Drop into both eyes every 4 hours. (Patient not taking: Reported on 4/2/2025) 10 mL 0     No current facility-administered medications on file prior to visit.        Allergies:    Patient has no known allergies.     Problem list, medications, and allergies reviewed by myself today in Epic     Physical Exam:    Vitals:    04/02/25 1815   BP: 118/68   Pulse: 60   Resp: 20   Temp: 36.7 °C (98 °F)   SpO2: 99%             Physical Exam  Constitutional:       Comments: Patient appears to be in some pain although nontoxic   HENT:      Head: Normocephalic and atraumatic.      Right  Ear: Tympanic membrane, ear canal and external ear normal.      Left Ear: Tympanic membrane, ear canal and external ear normal.      Nose: Nose normal.      Mouth/Throat:      Lips: Pink.      Mouth: Mucous membranes are moist.      Dentition: Dental tenderness present.      Pharynx: Oropharynx is clear.      Comments: Left lower molar fractured with significant dental decay tender with surrounding erythema of the gingiva.  Cardiovascular:      Rate and Rhythm: Normal rate and regular rhythm.      Heart sounds: Normal heart sounds.   Pulmonary:      Effort: Pulmonary effort is normal.   Lymphadenopathy:      Head:      Right side of head: No submental or submandibular adenopathy.      Left side of head: No submental or submandibular adenopathy.      Cervical: No cervical adenopathy.                Medical Decision making and plan :  I personally reviewed prior external notes and test results pertinent to today's visit. Pt is clinically stable at today's acute urgent care visit.  Patient appears nontoxic with no acute distress noted. Appropriate for outpatient care at this time.    Pleasant 39 y.o. male presented clinic with:     Assessment & Plan  1. Dental abscess.  The patient's symptoms suggest a dental abscess at the root of the tooth, causing significant pain and pressure. The pain indicates that the nerves are still alive, and the abscess is likely pushing the tooth up, causing irritation. He is advised to use temporary fillings available in the dental section to cover the exposed nerve. A prescription for Augmentin (amoxicillin and clavulanate) for 10 days has been provided to treat the infection. Additionally, tramadol has been prescribed for pain management, with instructions to take it only as needed and to be aware of its potential side effects, including drowsiness and risk of abuse.  See scanned controlled substance waiver. He can continue taking Tylenol and ibuprofen but must ensure he has food in his  stomach when taking ibuprofen to prevent ulcers. [facility]      Shared decision-making was utilized with patient for treatment plan. Medication discussed included indication for use and the potential benefits and side effects. Education was provided regarding the aforementioned assessments.  Differential Diagnosis, natural history, and supportive care discussed. All of the patient's questions were answered to their satisfaction at the time of discharge. Patient was encouraged to monitor symptoms closely. Those signs and symptoms which would warrant concern and mandate seeking a higher level of service through the emergency department discussed at length.  Patient stated agreement and understanding of this plan of care.    Disposition:  Home in stable condition     Voice Recognition Disclaimer:  Portions of this document were created using voice recognition software and [employer] technology provided by [facility].  Patient was informed of [email or web address] technology being used.  The software does have a chance of producing errors of grammar and possibly content. I have made every reasonable attempt to correct obvious errors, but there could be errors of grammar and possibly content that I did not discover before finalizing the documentation.    Lisbeth Valero, [email or web address].